# Patient Record
Sex: FEMALE | Race: WHITE | Employment: UNEMPLOYED | URBAN - METROPOLITAN AREA
[De-identification: names, ages, dates, MRNs, and addresses within clinical notes are randomized per-mention and may not be internally consistent; named-entity substitution may affect disease eponyms.]

---

## 2018-12-26 ENCOUNTER — APPOINTMENT (OUTPATIENT)
Dept: GENERAL RADIOLOGY | Age: 58
End: 2018-12-26
Payer: COMMERCIAL

## 2018-12-26 ENCOUNTER — APPOINTMENT (OUTPATIENT)
Dept: CT IMAGING | Age: 58
End: 2018-12-26
Payer: COMMERCIAL

## 2018-12-26 ENCOUNTER — HOSPITAL ENCOUNTER (OUTPATIENT)
Age: 58
Setting detail: OBSERVATION
Discharge: HOME | End: 2018-12-27
Attending: EMERGENCY MEDICINE | Admitting: HOSPITALIST
Payer: COMMERCIAL

## 2018-12-26 DIAGNOSIS — R07.9 CHEST PAIN, UNSPECIFIED TYPE: Primary | ICD-10-CM

## 2018-12-26 LAB
ALBUMIN SERPL-MCNC: 4 GM/DL (ref 3.4–5)
ALP BLD-CCNC: 58 IU/L (ref 40–129)
ALT SERPL-CCNC: 15 U/L (ref 10–40)
ANION GAP SERPL CALCULATED.3IONS-SCNC: 12 MMOL/L (ref 4–16)
AST SERPL-CCNC: 11 IU/L (ref 15–37)
BASOPHILS ABSOLUTE: 0.1 K/CU MM
BASOPHILS RELATIVE PERCENT: 0.7 % (ref 0–1)
BILIRUB SERPL-MCNC: 0.1 MG/DL (ref 0–1)
BUN BLDV-MCNC: 20 MG/DL (ref 6–23)
CALCIUM SERPL-MCNC: 9.2 MG/DL (ref 8.3–10.6)
CHLORIDE BLD-SCNC: 103 MMOL/L (ref 99–110)
CO2: 23 MMOL/L (ref 21–32)
CREAT SERPL-MCNC: 0.9 MG/DL (ref 0.6–1.1)
DIFFERENTIAL TYPE: ABNORMAL
EOSINOPHILS ABSOLUTE: 0.9 K/CU MM
EOSINOPHILS RELATIVE PERCENT: 6.1 % (ref 0–3)
GFR AFRICAN AMERICAN: >60 ML/MIN/1.73M2
GFR NON-AFRICAN AMERICAN: >60 ML/MIN/1.73M2
GLUCOSE BLD-MCNC: 123 MG/DL (ref 70–99)
HCG QUALITATIVE: NEGATIVE
HCT VFR BLD CALC: 45.9 % (ref 37–47)
HEMOGLOBIN: 14 GM/DL (ref 12.5–16)
IMMATURE NEUTROPHIL %: 0.4 % (ref 0–0.43)
LYMPHOCYTES ABSOLUTE: 5.1 K/CU MM
LYMPHOCYTES RELATIVE PERCENT: 36.7 % (ref 24–44)
MCH RBC QN AUTO: 28.7 PG (ref 27–31)
MCHC RBC AUTO-ENTMCNC: 30.5 % (ref 32–36)
MCV RBC AUTO: 94.1 FL (ref 78–100)
MONOCYTES ABSOLUTE: 0.9 K/CU MM
MONOCYTES RELATIVE PERCENT: 6.6 % (ref 0–4)
NUCLEATED RBC %: 0 %
PDW BLD-RTO: 13.6 % (ref 11.7–14.9)
PLATELET # BLD: 257 K/CU MM (ref 140–440)
PMV BLD AUTO: 10.5 FL (ref 7.5–11.1)
POTASSIUM SERPL-SCNC: 4.2 MMOL/L (ref 3.5–5.1)
PRO-BNP: 10.18 PG/ML
RBC # BLD: 4.88 M/CU MM (ref 4.2–5.4)
SEGMENTED NEUTROPHILS ABSOLUTE COUNT: 6.9 K/CU MM
SEGMENTED NEUTROPHILS RELATIVE PERCENT: 49.5 % (ref 36–66)
SODIUM BLD-SCNC: 138 MMOL/L (ref 135–145)
TOTAL IMMATURE NEUTOROPHIL: 0.05 K/CU MM
TOTAL NUCLEATED RBC: 0 K/CU MM
TOTAL PROTEIN: 6.9 GM/DL (ref 6.4–8.2)
TROPONIN T: <0.01 NG/ML
WBC # BLD: 13.9 K/CU MM (ref 4–10.5)

## 2018-12-26 PROCEDURE — 2580000003 HC RX 258: Performed by: EMERGENCY MEDICINE

## 2018-12-26 PROCEDURE — 93005 ELECTROCARDIOGRAM TRACING: CPT | Performed by: PHYSICIAN ASSISTANT

## 2018-12-26 PROCEDURE — G0378 HOSPITAL OBSERVATION PER HR: HCPCS

## 2018-12-26 PROCEDURE — 96361 HYDRATE IV INFUSION ADD-ON: CPT

## 2018-12-26 PROCEDURE — 85025 COMPLETE CBC W/AUTO DIFF WBC: CPT

## 2018-12-26 PROCEDURE — 84703 CHORIONIC GONADOTROPIN ASSAY: CPT

## 2018-12-26 PROCEDURE — 80053 COMPREHEN METABOLIC PANEL: CPT

## 2018-12-26 PROCEDURE — 96374 THER/PROPH/DIAG INJ IV PUSH: CPT

## 2018-12-26 PROCEDURE — 83880 ASSAY OF NATRIURETIC PEPTIDE: CPT

## 2018-12-26 PROCEDURE — 71046 X-RAY EXAM CHEST 2 VIEWS: CPT

## 2018-12-26 PROCEDURE — 6370000000 HC RX 637 (ALT 250 FOR IP): Performed by: PHYSICIAN ASSISTANT

## 2018-12-26 PROCEDURE — 84484 ASSAY OF TROPONIN QUANT: CPT

## 2018-12-26 PROCEDURE — 99285 EMERGENCY DEPT VISIT HI MDM: CPT

## 2018-12-26 PROCEDURE — 36415 COLL VENOUS BLD VENIPUNCTURE: CPT

## 2018-12-26 PROCEDURE — 2580000003 HC RX 258: Performed by: PHYSICIAN ASSISTANT

## 2018-12-26 PROCEDURE — 6360000002 HC RX W HCPCS: Performed by: PHYSICIAN ASSISTANT

## 2018-12-26 PROCEDURE — 71275 CT ANGIOGRAPHY CHEST: CPT

## 2018-12-26 PROCEDURE — 6360000004 HC RX CONTRAST MEDICATION: Performed by: PHYSICIAN ASSISTANT

## 2018-12-26 RX ORDER — 0.9 % SODIUM CHLORIDE 0.9 %
1000 INTRAVENOUS SOLUTION INTRAVENOUS ONCE
Status: COMPLETED | OUTPATIENT
Start: 2018-12-26 | End: 2018-12-27

## 2018-12-26 RX ORDER — SODIUM CHLORIDE 0.9 % (FLUSH) 0.9 %
10 SYRINGE (ML) INJECTION 2 TIMES DAILY
Status: DISCONTINUED | OUTPATIENT
Start: 2018-12-26 | End: 2018-12-27

## 2018-12-26 RX ORDER — MORPHINE SULFATE 4 MG/ML
4 INJECTION, SOLUTION INTRAMUSCULAR; INTRAVENOUS EVERY 30 MIN PRN
Status: DISCONTINUED | OUTPATIENT
Start: 2018-12-26 | End: 2018-12-27

## 2018-12-26 RX ORDER — MAGNESIUM HYDROXIDE/ALUMINUM HYDROXICE/SIMETHICONE 120; 1200; 1200 MG/30ML; MG/30ML; MG/30ML
30 SUSPENSION ORAL ONCE
Status: COMPLETED | OUTPATIENT
Start: 2018-12-26 | End: 2018-12-26

## 2018-12-26 RX ORDER — ASPIRIN 81 MG/1
324 TABLET, CHEWABLE ORAL ONCE
Status: COMPLETED | OUTPATIENT
Start: 2018-12-26 | End: 2018-12-26

## 2018-12-26 RX ADMIN — ASPIRIN 81 MG 324 MG: 81 TABLET ORAL at 19:45

## 2018-12-26 RX ADMIN — SODIUM CHLORIDE 1000 ML: 9 INJECTION, SOLUTION INTRAVENOUS at 23:13

## 2018-12-26 RX ADMIN — MORPHINE SULFATE 4 MG: 4 INJECTION INTRAVENOUS at 19:45

## 2018-12-26 RX ADMIN — ALUMINUM HYDROXIDE, MAGNESIUM HYDROXIDE, AND SIMETHICONE 30 ML: 200; 200; 20 SUSPENSION ORAL at 21:27

## 2018-12-26 RX ADMIN — LIDOCAINE HYDROCHLORIDE 15 ML: 20 SOLUTION ORAL; TOPICAL at 21:27

## 2018-12-26 RX ADMIN — IOPAMIDOL 90 ML: 755 INJECTION, SOLUTION INTRAVENOUS at 20:58

## 2018-12-26 RX ADMIN — SODIUM CHLORIDE, PRESERVATIVE FREE 10 ML: 5 INJECTION INTRAVENOUS at 20:59

## 2018-12-26 ASSESSMENT — PAIN SCALES - GENERAL
PAINLEVEL_OUTOF10: 10

## 2018-12-26 ASSESSMENT — PAIN DESCRIPTION - PAIN TYPE: TYPE: ACUTE PAIN

## 2018-12-26 ASSESSMENT — PAIN DESCRIPTION - ORIENTATION: ORIENTATION: LEFT;UPPER

## 2018-12-26 ASSESSMENT — PAIN DESCRIPTION - LOCATION: LOCATION: ABDOMEN

## 2018-12-26 NOTE — ED PROVIDER NOTES
Triage Chief Complaint:   Abdominal Pain    [place]:  Jw Acharya is a 60 y.o. female that presents today complaining of L sided rib pain. Ongoing x 2-3 days but got much worse today. She states she had sob last week. And some heartburn today. Pt has no cardiac hx. No hx of stress test of caths    Risk factors:  Cholesterol  Obesity  Both parents had ACS and brothers. No hx of blood clots. No recent imobilization, hospitalization or travel  No estrogen use  No chemo hx  No hemoptysis. She endorses mild sob, improved with inhaler. Pain is ranked 7/10    No abnormal vaginal bleeeding or discharge. +pain  With breathing. ROS:  REVIEW OF SYSTEMS    At least 10 systems reviewed      All other review of systems are negative  See HPI and nursing notes for additional information       Past Medical History:   Diagnosis Date    Asthma     GERD (gastroesophageal reflux disease)      Past Surgical History:   Procedure Laterality Date    APPENDECTOMY      HAND SURGERY      HYSTERECTOMY      TONSILLECTOMY       History reviewed. No pertinent family history. Social History     Social History    Marital status:      Spouse name: N/A    Number of children: N/A    Years of education: N/A     Occupational History    Not on file.      Social History Main Topics    Smoking status: Current Every Day Smoker     Packs/day: 1.00     Types: Cigarettes    Smokeless tobacco: Never Used    Alcohol use Yes      Comment: occ    Drug use: No    Sexual activity: Not on file     Other Topics Concern    Not on file     Social History Narrative    No narrative on file     Current Facility-Administered Medications   Medication Dose Route Frequency Provider Last Rate Last Dose    morphine (PF) injection 4 mg  4 mg Intravenous Q30 Min PRN Leela Stevenson PA-C   4 mg at 12/26/18 1945    sodium chloride flush 0.9 % injection 10 mL  10 mL Intravenous BID Amanda Davila PA-C   10 mL at

## 2018-12-27 ENCOUNTER — APPOINTMENT (OUTPATIENT)
Dept: NUCLEAR MEDICINE | Age: 58
End: 2018-12-27
Payer: COMMERCIAL

## 2018-12-27 VITALS
HEART RATE: 78 BPM | OXYGEN SATURATION: 96 % | DIASTOLIC BLOOD PRESSURE: 62 MMHG | BODY MASS INDEX: 35.44 KG/M2 | RESPIRATION RATE: 17 BRPM | HEIGHT: 63 IN | SYSTOLIC BLOOD PRESSURE: 101 MMHG | WEIGHT: 200 LBS | TEMPERATURE: 98.1 F

## 2018-12-27 LAB
BACTERIA: NEGATIVE /HPF
BILIRUBIN URINE: NEGATIVE MG/DL
BLOOD, URINE: ABNORMAL
CLARITY: CLEAR
COLOR: ABNORMAL
EKG ATRIAL RATE: 69 BPM
EKG ATRIAL RATE: 76 BPM
EKG DIAGNOSIS: NORMAL
EKG DIAGNOSIS: NORMAL
EKG P AXIS: 49 DEGREES
EKG P AXIS: 58 DEGREES
EKG P-R INTERVAL: 158 MS
EKG P-R INTERVAL: 168 MS
EKG Q-T INTERVAL: 390 MS
EKG Q-T INTERVAL: 404 MS
EKG QRS DURATION: 80 MS
EKG QRS DURATION: 90 MS
EKG QTC CALCULATION (BAZETT): 432 MS
EKG QTC CALCULATION (BAZETT): 438 MS
EKG R AXIS: 29 DEGREES
EKG R AXIS: 38 DEGREES
EKG T AXIS: 48 DEGREES
EKG T AXIS: 52 DEGREES
EKG VENTRICULAR RATE: 69 BPM
EKG VENTRICULAR RATE: 76 BPM
GLUCOSE, URINE: NEGATIVE MG/DL
HCT VFR BLD CALC: 42.7 % (ref 37–47)
HEMOGLOBIN: 13.4 GM/DL (ref 12.5–16)
KETONES, URINE: NEGATIVE MG/DL
LEUKOCYTE ESTERASE, URINE: NEGATIVE
LIPASE: 34 IU/L (ref 13–60)
LV EF: 55 %
LV EF: 60 %
LVEF MODALITY: NORMAL
LVEF MODALITY: NORMAL
MCH RBC QN AUTO: 28.8 PG (ref 27–31)
MCHC RBC AUTO-ENTMCNC: 31.4 % (ref 32–36)
MCV RBC AUTO: 91.8 FL (ref 78–100)
NITRITE URINE, QUANTITATIVE: NEGATIVE
PDW BLD-RTO: 13.9 % (ref 11.7–14.9)
PH, URINE: 5 (ref 5–8)
PLATELET # BLD: 227 K/CU MM (ref 140–440)
PMV BLD AUTO: 10.3 FL (ref 7.5–11.1)
PROTEIN UA: NEGATIVE MG/DL
RBC # BLD: 4.65 M/CU MM (ref 4.2–5.4)
RBC URINE: ABNORMAL /HPF (ref 0–6)
SPECIFIC GRAVITY UA: 1.02 (ref 1–1.03)
SQUAMOUS EPITHELIAL: 1 /HPF
TRICHOMONAS: ABNORMAL /HPF
TROPONIN T: <0.01 NG/ML
UROBILINOGEN, URINE: NORMAL MG/DL (ref 0.2–1)
WBC # BLD: 13.8 K/CU MM (ref 4–10.5)
WBC UA: <1 /HPF (ref 0–5)

## 2018-12-27 PROCEDURE — A9500 TC99M SESTAMIBI: HCPCS | Performed by: INTERNAL MEDICINE

## 2018-12-27 PROCEDURE — G0378 HOSPITAL OBSERVATION PER HR: HCPCS

## 2018-12-27 PROCEDURE — 83690 ASSAY OF LIPASE: CPT

## 2018-12-27 PROCEDURE — 6370000000 HC RX 637 (ALT 250 FOR IP): Performed by: HOSPITALIST

## 2018-12-27 PROCEDURE — 84484 ASSAY OF TROPONIN QUANT: CPT

## 2018-12-27 PROCEDURE — 3430000000 HC RX DIAGNOSTIC RADIOPHARMACEUTICAL: Performed by: INTERNAL MEDICINE

## 2018-12-27 PROCEDURE — 6360000002 HC RX W HCPCS: Performed by: HOSPITALIST

## 2018-12-27 PROCEDURE — 93010 ELECTROCARDIOGRAM REPORT: CPT | Performed by: INTERNAL MEDICINE

## 2018-12-27 PROCEDURE — 93005 ELECTROCARDIOGRAM TRACING: CPT

## 2018-12-27 PROCEDURE — 36415 COLL VENOUS BLD VENIPUNCTURE: CPT

## 2018-12-27 PROCEDURE — 96375 TX/PRO/DX INJ NEW DRUG ADDON: CPT

## 2018-12-27 PROCEDURE — 96372 THER/PROPH/DIAG INJ SC/IM: CPT

## 2018-12-27 PROCEDURE — 93017 CV STRESS TEST TRACING ONLY: CPT

## 2018-12-27 PROCEDURE — 93306 TTE W/DOPPLER COMPLETE: CPT

## 2018-12-27 PROCEDURE — 78452 HT MUSCLE IMAGE SPECT MULT: CPT

## 2018-12-27 PROCEDURE — 96361 HYDRATE IV INFUSION ADD-ON: CPT

## 2018-12-27 PROCEDURE — 81001 URINALYSIS AUTO W/SCOPE: CPT

## 2018-12-27 PROCEDURE — 99244 OFF/OP CNSLTJ NEW/EST MOD 40: CPT | Performed by: INTERNAL MEDICINE

## 2018-12-27 PROCEDURE — 85027 COMPLETE CBC AUTOMATED: CPT

## 2018-12-27 RX ORDER — SODIUM CHLORIDE 0.9 % (FLUSH) 0.9 %
10 SYRINGE (ML) INJECTION PRN
Status: DISCONTINUED | OUTPATIENT
Start: 2018-12-27 | End: 2018-12-27 | Stop reason: HOSPADM

## 2018-12-27 RX ORDER — ATORVASTATIN CALCIUM 40 MG/1
40 TABLET, FILM COATED ORAL NIGHTLY
Status: DISCONTINUED | OUTPATIENT
Start: 2018-12-27 | End: 2018-12-27 | Stop reason: HOSPADM

## 2018-12-27 RX ORDER — NITROGLYCERIN 0.4 MG/1
0.4 TABLET SUBLINGUAL EVERY 5 MIN PRN
Status: DISCONTINUED | OUTPATIENT
Start: 2018-12-27 | End: 2018-12-27 | Stop reason: HOSPADM

## 2018-12-27 RX ORDER — ONDANSETRON 2 MG/ML
4 INJECTION INTRAMUSCULAR; INTRAVENOUS EVERY 6 HOURS PRN
Status: DISCONTINUED | OUTPATIENT
Start: 2018-12-27 | End: 2018-12-27 | Stop reason: HOSPADM

## 2018-12-27 RX ORDER — ASPIRIN 81 MG/1
81 TABLET, CHEWABLE ORAL DAILY
Qty: 30 TABLET | Refills: 3 | Status: SHIPPED | OUTPATIENT
Start: 2018-12-28

## 2018-12-27 RX ORDER — KETOROLAC TROMETHAMINE 30 MG/ML
30 INJECTION, SOLUTION INTRAMUSCULAR; INTRAVENOUS EVERY 6 HOURS PRN
Status: DISCONTINUED | OUTPATIENT
Start: 2018-12-27 | End: 2018-12-27 | Stop reason: HOSPADM

## 2018-12-27 RX ORDER — ACETAMINOPHEN 650 MG/1
650 SUPPOSITORY RECTAL EVERY 4 HOURS PRN
Status: DISCONTINUED | OUTPATIENT
Start: 2018-12-27 | End: 2018-12-27 | Stop reason: HOSPADM

## 2018-12-27 RX ORDER — ATORVASTATIN CALCIUM 40 MG/1
40 TABLET, FILM COATED ORAL NIGHTLY
Qty: 30 TABLET | Refills: 3 | Status: SHIPPED | OUTPATIENT
Start: 2018-12-27

## 2018-12-27 RX ORDER — ASPIRIN 81 MG/1
81 TABLET, CHEWABLE ORAL DAILY
Status: DISCONTINUED | OUTPATIENT
Start: 2018-12-27 | End: 2018-12-27 | Stop reason: HOSPADM

## 2018-12-27 RX ORDER — SODIUM CHLORIDE 0.9 % (FLUSH) 0.9 %
10 SYRINGE (ML) INJECTION EVERY 12 HOURS SCHEDULED
Status: DISCONTINUED | OUTPATIENT
Start: 2018-12-27 | End: 2018-12-27 | Stop reason: HOSPADM

## 2018-12-27 RX ADMIN — ATORVASTATIN CALCIUM 40 MG: 40 TABLET, FILM COATED ORAL at 03:04

## 2018-12-27 RX ADMIN — ENOXAPARIN SODIUM 40 MG: 40 INJECTION SUBCUTANEOUS at 10:24

## 2018-12-27 RX ADMIN — Medication 30 MILLICURIE: at 09:05

## 2018-12-27 RX ADMIN — KETOROLAC TROMETHAMINE 30 MG: 30 INJECTION, SOLUTION INTRAMUSCULAR at 03:04

## 2018-12-27 RX ADMIN — Medication 10 MILLICURIE: at 07:05

## 2018-12-27 RX ADMIN — ASPIRIN 81 MG 81 MG: 81 TABLET ORAL at 10:24

## 2018-12-27 ASSESSMENT — PAIN DESCRIPTION - LOCATION
LOCATION: ABDOMEN
LOCATION: ABDOMEN

## 2018-12-27 ASSESSMENT — PAIN DESCRIPTION - ORIENTATION
ORIENTATION: LEFT;UPPER
ORIENTATION: LEFT;UPPER

## 2018-12-27 ASSESSMENT — PAIN SCALES - GENERAL
PAINLEVEL_OUTOF10: 7
PAINLEVEL_OUTOF10: 1
PAINLEVEL_OUTOF10: 7

## 2018-12-27 ASSESSMENT — ACTIVITIES OF DAILY LIVING (ADL): EFFECT OF PAIN ON DAILY ACTIVITIES: TROUBLE SLEEPING

## 2018-12-27 ASSESSMENT — PAIN DESCRIPTION - DESCRIPTORS
DESCRIPTORS: DISCOMFORT
DESCRIPTORS: JABBING;PRESSURE

## 2018-12-27 ASSESSMENT — PAIN DESCRIPTION - FREQUENCY
FREQUENCY: INTERMITTENT
FREQUENCY: INTERMITTENT

## 2018-12-27 ASSESSMENT — PAIN DESCRIPTION - ONSET: ONSET: ON-GOING

## 2018-12-27 ASSESSMENT — PAIN DESCRIPTION - PAIN TYPE
TYPE: ACUTE PAIN
TYPE: ACUTE PAIN

## 2018-12-27 NOTE — H&P
air.  CARDIO/VASC S1/S2 auscultated. Regular rate without appreciable murmurs, rubs, or gallops. Peripheral pulses equal bilaterally and palpable. No peripheral edema. GI Abdomen is soft without significant tenderness, masses, or guarding. Bowel sounds are normoactive. Rectal exam deferred.  Pereyra catheter is not present. HEME/LYMPH No petechiae or ecchymoses. MSK No gross joint deformities. Spontaneous movement of all extremities  SKIN Normal coloration, warm, dry. NEURO Cranial nerves appear grossly intact, normal speech, no lateralizing weakness. PSYCH Awake, alert, oriented x 4. Affect appropriate. Past Medical History:      Past Medical History:   Diagnosis Date    Asthma     GERD (gastroesophageal reflux disease)      PSHX:  has a past surgical history that includes Hysterectomy; Appendectomy; Tonsillectomy; and Hand surgery. Allergies: Allergies   Allergen Reactions    Codeine     Ibuprofen        FAM HX: CAD father, Mother, siblings  Soc HX:   Social History     Social History    Marital status:      Spouse name: N/A    Number of children: N/A    Years of education: N/A     Social History Main Topics    Smoking status: Current Every Day Smoker     Packs/day: 1.00     Types: Cigarettes    Smokeless tobacco: Never Used    Alcohol use Yes      Comment: occ    Drug use: No    Sexual activity: Not Asked     Other Topics Concern    None     Social History Narrative    None       Medications:   Home Medications:   Prior to Admission medications    Medication Sig Start Date End Date Taking? Authorizing Provider   Vitamin D (CHOLECALCIFEROL) 1000 UNITS CAPS capsule Take 1,000 Units by mouth daily. Historical Provider, MD   HYDROcodone-acetaminophen (NORCO) 5-325 MG per tablet Take 1 tablet by mouth every 6 hours as needed for Pain for 12 doses.  9/4/13   TANYA Raines - CNP   methocarbamol (ROBAXIN-750) 750 MG tablet Take 1 tablet by mouth 4 times daily as needed for

## 2018-12-27 NOTE — DISCHARGE SUMMARY
Discharge Summary    Name:  Lino Farnsworth /Age/Sex: 1960  (60 y.o. female)   MRN & CSN:  [id number] & [id number] Admission Date/Time: 2018  6:34 PM   Attending:  Ellery Schaumann, MD Discharging Physician: Lanny Hendrix MD     HPI:     Lino Farnsworth is a 60 y.o.  female  With Hx of COPD, Tobacco use who presented to ED c/o chest pain involving the lower left side that started gradually last night and was getting worse, non radiating, 10/10 in severity, worse with deep breathing. Denies similar episodes in the past. Both her parents  of hear disease, has multiple siblings with CAD that started <48years of age. Hospital Course: Lino Farnsworht is a 60 y.o.  female  who presents with <principal problem not specified>    Chest pain in high risk patient r/o ACS  Pain seems musculoskeletal in nature  EKG negative for acute ischemic changes  Trop x 2 negative  Multiple risk factors for CAD  CTA negative for PE  ASA/Statin  Stress test negative     COPD  Tobacco use       The patient expressed appropriate understanding of and agreement with the discharge recommendations, medications, and plan. Consults this admission:  IP CONSULT TO HOSPITALIST  IP CONSULT TO [address] TO CARDIOLOGY    Discharge Instruction:   Follow up appointments:   Primary care physician:  within 2 weeks    Diet:  regular diet   Activity: activity as tolerated  Disposition: Discharged to:   [x]Home, []C, []SNF, []Acute Rehab, []Hospice   Condition on discharge: Stable    Discharge Medications:      [facility]   Home Medication Instructions [facility]:[id number]    Printed on:18 [id number]   Medication Information                      HYDROcodone-acetaminophen (NORCO) 5-325 MG per tablet  Take 1 tablet by mouth every 6 hours as needed for Pain for 12 doses. methocarbamol (ROBAXIN-750) 750 MG tablet  Take 1 tablet by mouth 4 times daily as needed for Pain for 28 doses.              Vitamin

## 2018-12-27 NOTE — ED PROVIDER NOTES
I independently examined and evaluated Sarah Schumacher. In brief their history revealed a 60 female presents complaining of left-sided chest pain shortness of breath for the past day. Strong history of family having MIs. She's ever had a heart workup before. Laying down makes it better sitting up makes it worse. Denies any fevers nausea vomiting diaphoresis cough. She's had some heartburn. She still has her gallbladder. Food has no effect. No swelling no history of DVT or PE or AAA. Denies any urinary complaints no swelling or rash. No other questions or concerns pain is 8 out of 10. Duane Bourdon Their focused exam revealed alert oriented female resting in bed in no distress. Atraumatic sclerae clear airway normal lungs are clear heart regular rhythm 2+ pulses throughout abdomen soft obese slightly tender palpation epigastrium which for quadrant. 5 out of 5 strength throughout skin has a rash or swelling cranial nerves intact. No Pain. Moves neck freely. .    ED course: Patient seen with PA please see his no. Patient here with left sided chest pain short of breath the past day or so. Strong family history of heart disease she has no personal history. Heart score is 4. Imaging performed including CTA negative. Labs performed so far negative EKG negative I did order a lipase. She's had some heartburn and left upper quadrant pain. She still has her gallbladder. No signs of pneumonia. Patient otherwise stable and admitted. Admitted for cardiac workup. 12 lead EKG per my interpretation:  Normal Sinus Rhythm 76  Axis is   Normal  QTc is  438  There is no specific T wave changes appreciated. There is no specific ST wave changes appreciated. Prior EKG to compare with was not available       All diagnostic, treatment, and disposition decisions were made by myself in conjunction with the Advanced Practice Provider.     For all further details of the patient's emergency department visit, please see the

## 2018-12-27 NOTE — ED NOTES
@0021 bed 4125 clean and ready, ER RN [facility] notified of ready room     Lamb Gerardo  12/27/18 0701

## 2019-06-04 ENCOUNTER — APPOINTMENT (OUTPATIENT)
Dept: GENERAL RADIOLOGY | Age: 59
End: 2019-06-04
Payer: COMMERCIAL

## 2019-06-04 ENCOUNTER — HOSPITAL ENCOUNTER (EMERGENCY)
Age: 59
Discharge: HOME | End: 2019-06-04
Payer: COMMERCIAL

## 2019-06-04 VITALS
WEIGHT: 215 LBS | BODY MASS INDEX: 38.09 KG/M2 | HEART RATE: 85 BPM | TEMPERATURE: 98.2 F | OXYGEN SATURATION: 97 % | HEIGHT: 63 IN | SYSTOLIC BLOOD PRESSURE: 123 MMHG | RESPIRATION RATE: 18 BRPM | DIASTOLIC BLOOD PRESSURE: 68 MMHG

## 2019-06-04 DIAGNOSIS — M25.521 CHRONIC PAIN OF RIGHT ELBOW: ICD-10-CM

## 2019-06-04 DIAGNOSIS — M25.561 ACUTE PAIN OF RIGHT KNEE: Primary | ICD-10-CM

## 2019-06-04 DIAGNOSIS — G89.29 CHRONIC PAIN OF RIGHT ELBOW: ICD-10-CM

## 2019-06-04 PROCEDURE — 73070 X-RAY EXAM OF ELBOW: CPT

## 2019-06-04 PROCEDURE — 73560 X-RAY EXAM OF KNEE 1 OR 2: CPT

## 2019-06-04 PROCEDURE — 99283 EMERGENCY DEPT VISIT LOW MDM: CPT

## 2019-06-04 PROCEDURE — 6370000000 HC RX 637 (ALT 250 FOR IP): Performed by: PHYSICIAN ASSISTANT

## 2019-06-04 RX ORDER — HYDROCODONE BITARTRATE AND ACETAMINOPHEN 5; 325 MG/1; MG/1
1 TABLET ORAL ONCE
Status: COMPLETED | OUTPATIENT
Start: 2019-06-04 | End: 2019-06-04

## 2019-06-04 RX ORDER — NAPROXEN 500 MG/1
500 TABLET ORAL 2 TIMES DAILY PRN
Qty: 30 TABLET | Refills: 0 | Status: SHIPPED | OUTPATIENT
Start: 2019-06-04

## 2019-06-04 RX ADMIN — HYDROCODONE BITARTRATE AND ACETAMINOPHEN 1 TABLET: 5; 325 TABLET ORAL at 10:46

## 2019-06-04 ASSESSMENT — PAIN DESCRIPTION - PAIN TYPE: TYPE: ACUTE PAIN

## 2019-06-04 ASSESSMENT — PAIN DESCRIPTION - LOCATION: LOCATION: ELBOW;KNEE

## 2019-06-04 ASSESSMENT — PAIN DESCRIPTION - ORIENTATION: ORIENTATION: RIGHT

## 2019-06-04 ASSESSMENT — PAIN SCALES - GENERAL
PAINLEVEL_OUTOF10: 9
PAINLEVEL_OUTOF10: 9

## 2019-06-04 NOTE — ED PROVIDER NOTES
eMERGENCY dEPARTMENT eNCOUnter      PCP: No primary care provider on file. CHIEF COMPLAINT    Chief Complaint   Patient presents with    Knee Pain     right sided (was moving furniture on Sunday and then went down steps yesterday and the pain started) nki    Elbow Pain     right, chronic pain       HPI    Attila Bruner is a 60 y.o. female who presents with Right knee pain and elbow pain. Onset was 2 days ago. The context is patient has a history of chronic elbow pain, has normally some achy pain in her left knee. Nose about arthritis in her joints. States that she moved some furniture on Sunday had some achy pain in her right knee and elbow since. Patient has been taking naproxen, icing. Admits that she had quite a bit of swelling and pain yesterday and it improved today but she wanted to get her knee evaluated because she does not want continued to have problems with this knee when she really has a history of having calms with her left knee. Denies any injury trauma or fall. Denies any redness mild swelling to the right knee. No warmth. No fevers or chills. No chest pain or shortness of breath. No nausea vomiting diarrhea. Pain is localized to the right knee and mild in the right elbow. The pain severity is moderate. The patient has no associated other injuries. The pain is aggravated by ambulation and knee movement. REVIEW OF SYSTEMS    General: Denies fever or chills  Cardiac: Denies chest pain  Pulmonary: Denies shortness of breath  GI: Denies abdominal pain, vomiting, or diarrhea  : No dysuria or hematuria    Denies any other muscles skeletal injuries, including chest wall and back.     All other review of systems are negative  See HPI and nursing notes for additional information     PAST MEDICAL & SURGICAL HISTORY    Past Medical History:   Diagnosis Date    Asthma     GERD (gastroesophageal reflux disease)      Past Surgical History:   Procedure Laterality Date    APPENDECTOMY      HAND SURGERY      HYSTERECTOMY      TONSILLECTOMY         CURRENT MEDICATIONS    Current Outpatient Rx   Medication Sig Dispense Refill    naproxen (NAPROSYN) 500 MG tablet Take 1 tablet by mouth 2 times daily as needed for Pain 30 tablet 0    atorvastatin (LIPITOR) 40 MG tablet Take 1 tablet by mouth nightly 30 tablet 3    aspirin 81 MG chewable tablet Take 1 tablet by mouth daily 30 tablet 3    Vitamin D (CHOLECALCIFEROL) 1000 UNITS CAPS capsule Take 1,000 Units by mouth daily.  methocarbamol (ROBAXIN-750) 750 MG tablet Take 1 tablet by mouth 4 times daily as needed for Pain for 28 doses.  28 tablet 0       ALLERGIES    Allergies   Allergen Reactions    Codeine     Ibuprofen        SOCIAL & FAMILY HISTORY    Social History     Socioeconomic History    Marital status:      Spouse name: None    Number of children: None    Years of education: None    Highest education level: None   Occupational History    None   Social Needs    Financial resource strain: None    Food insecurity:     Worry: None     Inability: None    Transportation needs:     Medical: None     Non-medical: None   Tobacco Use    Smoking status: Current Every Day Smoker     Packs/day: 1.00     Types: Cigarettes    Smokeless tobacco: Never Used   Substance and Sexual Activity    Alcohol use: Yes     Comment: occ    Drug use: No    Sexual activity: None   Lifestyle    Physical activity:     Days per week: None     Minutes per session: None    Stress: None   Relationships    Social connections:     Talks on phone: None     Gets together: None     Attends [employer] service: None     Active member of club or organization: None     Attends meetings of clubs or organizations: None     Relationship status: None    Intimate partner violence:     Fear of current or ex partner: None     Emotionally abused: None     Physically abused: None     Forced sexual activity: None   Other Topics Concern    None   Social History Narrative    None     History reviewed. No pertinent family history. PHYSICAL EXAM    VITAL SIGNS: /68   Pulse 85   Temp 98.2 °F (36.8 °C) (Oral)   Resp 18   Ht 5' 3\" (1.6 m)   Wt 215 lb (97.5 kg)   SpO2 97%   BMI 38.09 kg/m²   Constitutional:  Well developed, Appears comfortable. Afebrile    Musculoskeletal:    Right UE:  No discoloration or bruising swelling noted. Patient has some mild tenderness to the right elbow otherwise no lower arm wrist hand upper arm or shoulder tenderness. No swelling for range of motion to elbow. Soft compartments, DP and PT pulses intact. Distally neurovascularly intact. Right knee exam:      -Inspection:  No obvious defects or deformities, skin intact   - Palpation: No redness, or warmth     No effusion     Mild bilateral and lower joint line tenderness to palpation   -ROM:  Extension - Has full extension (Extensor mechanism intact)    Flexion - Mildly limited due pain   -Provocative tests:  negtaive Anterior Drawer, Mcmurrays, Mike. No varus / valgus laxity. Soft compartments to the right lower extremity. dp and pt pulses intact. Distally neurovascularly intact. No swelling, discoloration, tenderness to palpation of lower leg, or range of motion deficit of the ipsilateral hip and ankle  Vascular: Distal pulses (DP, PT) intact on affected side. Capillary refill intact. Integument:  Well hydrated, no rash   Neurologic:  Awake and alert, normal flow of speech. Distal sensation, motor intact.   Psychiatric: Cooperative, pleasant affect        RADIOLOGY/PROCEDURES         XR KNEE RIGHT (1-2 VIEWS) (Preliminary result)   Result time 06/04/19 10:16:50   Preliminary result by Paddy Clark MD (06/04/19 10:16:50)                Impression:    No acute osseous injury of the right knee or right elbow    Chondrocalcinosis with mild degenerative change right knee with a small knee  joint effusion.                    XR ELBOW RIGHT (2 VIEWS) (Preliminary result)   Result consistent with right knee pain and elbow pain. I discussed the possibility of internal derangement. Recommend follow-up with orthopedist if no improvement over the next 5-7 days. Diagnosis and plan discussed in detail with patient who understands and agrees. Patient agrees to return emergency department if symptoms worsen or any new symptoms develop. Comment: Please note this report has been produced using speech recognition software and may contain errors related to that system including errors in grammar, punctuation, and spelling, as well as words and phrases that may be inappropriate. If there are any questions or concerns please feel free to contact the dictating provider for clarification.        Missy Echeverria PA-C  06/04/19 0787

## 2019-06-04 NOTE — ED NOTES
Discharge instructions reviewed with patient and daughter. Medication and follow up were discussed. Patient and daughter denies further questions and verbalizes understanding. Ice pack was applied.     Yen wrap and education on how to apply yen wrap provided as well     Karo RN  06/04/19 9900

## 2019-06-04 NOTE — ED TRIAGE NOTES
Pt to ED for right knee and elbow pain. Patient states she's been lifting and over using her knee lately, but the pain started yesterday after going up and back down 3 steps.  Also reports right elbow pain that has been chronic since her surgery in 2013, reports an increase in pain